# Patient Record
Sex: MALE | Employment: FULL TIME | URBAN - METROPOLITAN AREA
[De-identification: names, ages, dates, MRNs, and addresses within clinical notes are randomized per-mention and may not be internally consistent; named-entity substitution may affect disease eponyms.]

---

## 2019-01-17 ENCOUNTER — OFFICE VISIT - HEALTHEAST (OUTPATIENT)
Dept: FAMILY MEDICINE | Facility: CLINIC | Age: 44
End: 2019-01-17

## 2019-01-17 DIAGNOSIS — M54.50 LUMBAR BACK PAIN: ICD-10-CM

## 2019-01-17 ASSESSMENT — MIFFLIN-ST. JEOR: SCORE: 2131.85

## 2019-01-30 ENCOUNTER — OFFICE VISIT - HEALTHEAST (OUTPATIENT)
Dept: FAMILY MEDICINE | Facility: CLINIC | Age: 44
End: 2019-01-30

## 2019-01-30 DIAGNOSIS — Z76.89 ENCOUNTER TO ESTABLISH CARE: ICD-10-CM

## 2019-01-30 DIAGNOSIS — M54.50 CHRONIC BILATERAL LOW BACK PAIN WITHOUT SCIATICA: ICD-10-CM

## 2019-01-30 DIAGNOSIS — G89.29 CHRONIC BILATERAL LOW BACK PAIN WITHOUT SCIATICA: ICD-10-CM

## 2021-06-02 VITALS — BODY MASS INDEX: 34.71 KG/M2 | HEIGHT: 73 IN | WEIGHT: 261.9 LBS

## 2021-06-02 VITALS — WEIGHT: 263.7 LBS | BODY MASS INDEX: 34.79 KG/M2

## 2021-06-17 NOTE — PATIENT INSTRUCTIONS - HE
Patient Instructions by Baron Puckett PA-C at 1/17/2019  4:40 PM     Author: Baron Puckett PA-C Service: -- Author Type: Physician Assistant    Filed: 1/17/2019  5:39 PM Encounter Date: 1/17/2019 Status: Addendum    : Baron Puckett PA-C (Physician Assistant)    Related Notes: Original Note by Baron Puckett PA-C (Physician Assistant) filed at 1/17/2019  5:39 PM       Establish care with primary care provider before he leaves the office today.  Your primary care provider will manage her work restrictions and also any work comp related paperwork.  I suggest you avoid heavy lifting, bending and twisting in the short-term.  Use of topical ice and ibuprofen for analgesia and anti-inflammatory effect.  Follow-up with primary care provider as mentioned      Patient Education     Back Pain (Acute or Chronic)    Back pain is one of the most common problems. The good news is that most people feel better in 1 to 2 weeks, and most of the rest in 1 to 2 months. Most people can remain active.  People who have pain describe it differently--not everyone is the same.    The pain can be sharp, stabbing, shooting, aching, cramping or burning.    Movement, standing, bending, lifting, sitting, or walking may worsen pain.    It can be localized to one spot or area, or it can be more generalized.    It can spread or radiate upwards, to the front, or go down your arms or legs (sciatica).    It can cause muscle spasm.  Most of the time, mechanical problems with the muscles or spine cause the pain. Mechanical problems are usually caused by an injury to the muscles or ligaments. While illness can cause back pain, it is usually not caused by a serious illness. Mechanical problems include:     Physical activity such as sports, exercise, work, or normal activity    Overexertion, lifting, pushing, pulling incorrectly or too aggressively    Sudden twisting, bending, or stretching from an accident, or accidental movement    Poor  posture    Stretching or moving wrong, without noticing pain at the time    Poor coordination, lack of regular exercise (check with your doctor about this)    Spinal disc disease or arthritis    Stress  Pain can also be related to pregnancy, or illness like appendicitis, bladder or kidney infections, pelvic infections, and many other things.  Acute back pain usually gets better in 1 to 2 weeks. Back pain related to disk disease, arthritis in the spinal joints or spinal stenosis (narrowing of the spinal canal) can become chronic and last for months or years.  Unless you had a physical injury (for example, a car accident or fall) X-rays are usually not needed for the initial evaluation of back pain. If pain continues and does not respond to medical treatment, X-rays and other tests may be needed.  Home care  Try these home care recommendations:    When in bed, try to find a position of comfort. A firm mattress is best. Try lying flat on your back with pillows under your knees. You can also try lying on your side with your knees bent up towards your chest and a pillow between your knees.    At first, do not try to stretch out the sore spots. If there is a strain, it is not like the good soreness you get after exercising without an injury. In this case, stretching may make it worse.    Don't sit for long periods, as in a long car ride or during other travel. This puts more stress on the lower back than standing or walking.    During the first 24 to 72 hours after an acute injury or flare up of chronic back pain, apply an ice pack to the painful area for 20 minutes and then remove it for 20 minutes. Do this over a period of 60 to 90 minutes or several times a day. This will reduce swelling and pain. Wrap the ice pack in a thin towel or plastic to protect your skin.    You can start with ice, then switch to heat. Heat (hot shower, hot bath, or heating pad) reduces pain and works well for muscle spasms. Heat can be applied  to the painful area for 20 minutes then remove it for 20 minutes. Do this over a period of 60 to 90 minutes or several times a day. Do not sleep on a heating pad. It can lead to skin burns or tissue damage.    You can alternate ice and heat therapy. Talk with your doctor about the best treatment for your back pain.    Therapeutic massage can help relax the back muscles without stretching them.    Be aware of safe lifting methods and do not lift anything without stretching first.  Medicines  Talk to your doctor before using medicine, especially if you have other medical problems or are taking other medicines.    You may use over-the-counter medicine as directed on the bottle to control pain, unless another pain medicine was prescribed. If you have chronic conditions like diabetes, liver or kidney disease, stomach ulcers, or gastrointestinal bleeding, or are taking blood thinners, talk to your doctor before taking any medicine.    Be careful if you are given a prescription medicines, narcotics, or medicine for muscle spasms. They can cause drowsiness, affect your coordination, reflexes, and judgement. Do not drive or operate heavy machinery.  Follow-up care  Follow up with your healthcare provider, or as advised.   A radiologist will review any X-rays that were taken. Your provide will notify you of any new findings that may affect your care.  Call 911  Call 911 if any of the following occur:    Trouble breathing    Confusion    Very drowsy or trouble awakening    Fainting or loss of consciousness    Rapid or very slow heart rate    Loss of bowel or bladder control  When to seek medical advice  Call your healthcare provider right away if any of these occur:     Pain becomes worse or spreads to your legs    Weakness or numbness in one or both legs    Numbness in the groin or genital area  Date Last Reviewed: 7/1/2016 2000-2017 [employer]. [address] [place] [id number]. All rights reserved.  This information is not intended as a substitute for professional medical care. Always follow your healthcare professional's instructions.           Patient Education     Exercises to Strengthen Your Lower Back  Strong lower back and abdominal muscles work together to support your spine. The exercises below will help strengthen the lower back. It is important that you begin exercising slowly and increase levels gradually.  Always begin any exercise program with stretching. If you feel pain while doing any of these exercises, stop and talk to your doctor about a more specific exercise program that better suits your condition.   Low back stretch  The point of stretching is to make you more flexible and increase your range of motion. Stretch only as much as you are able. Stretch slowly. Do not push your stretch to the limit. If at any point you feel pain while stretching, this is your (temporary) limit.    Lie on your back with your knees bent and both feet on the ground.    Slowly raise your left knee to your chest as you flatten your lower back against the floor. Hold for 5 seconds.    Relax and repeat the exercise with your right knee.    Do 10 of these exercises for each leg.    Repeat hugging both knees to your chest at the same time.  Building lower back strength  Start your exercise routine with 10 to 30 minutes a day, 1 to 3 times a day.  Initial exercises  Lying on your back:  1. Ankle pumps: Move your foot up and down, towards your head, and then away. Repeat 10 times with each foot.  2. Heel slides: Slowly bend your knee, drawing the heel of your foot towards you. Then slide your heel/foot from you, straightening your knee. Do not lift your foot off the floor (this is not a leg lift).  3. Abdominal contraction: Bend your knees and put your hands on your stomach. Tighten your stomach muscles. Hold for 5 seconds, then relax. Repeat 10 times.  4. Straight leg raise: Bend one leg at the knee and keep the other  leg straight. Tighten your stomach muscles. Slowly lift your straight leg 6 to 12 inches off the floor and hold for up to 5 seconds. Repeat 10 times on each side.  Standin. Wall squats: Stand with your back against the wall. Move your feet about 12 inches away from the wall. Tighten your stomach muscles, and slowly bend your knees until they are at about a 45 degree angle. Do not go down too far. Hold about 5 seconds. Then slowly return to your starting position. Repeat 10 times.  2. Heel raises: Stand facing the wall. Slowly raise the heels of your feet up and down, while keeping your toes on the floor. If you have trouble balancing, you can touch the wall with your hands. Repeat 10 times.  More advanced exercises  When you feel comfortable enough, try these exercises.  1. Kneeling lumbar extension: Begin on your hands and knees. At the same time, raise and straighten your right arm and left leg until they are parallel to the ground. Hold for 2 seconds and come back slowly to a starting position. Repeat with left arm and right leg, alternating 10 times.  2. Prone lumbar extension: Lie face down, arms extended overhead, palms on the floor. At the same time, raise your right arm and left leg as high as comfortably possible. Hold for 10 seconds and slowly return to start. Repeat with left arm and right leg, alternating 10 times. Gradually build up to 20 times. (Advanced: Repeat this exercise raising both arms and both legs a few inches off the floor at the same time. Hold for 5 seconds and release.)  3. Pelvic tilt: Lie on the floor on your back with your knees bent at 90 degrees. Your feet should be flat on the floor. Inhale, exhale, then slowly contract your abdominal muscles bringing your navel toward your spine. Let your pelvis rock back until your lower back is flat on the floor. Hold for 10 seconds while breathing smoothly.  4. Abdominal crunch: Perform a pelvic tilt (above) flattening your lower back  against the floor. Holding the tension in your abdominal muscles, take another breath and raise your shoulder blades off the ground (this is not a full sit-up). Keep your head in line with your body (dont bend your neck forward). Hold for 2 seconds, then slowly lower.  Date Last Reviewed: 6/1/2016 2000-2017 [employer] [address] [place], [place] [id number]. All rights reserved. This information is not intended as a substitute for professional medical care. Always follow your healthcare professional's instructions.

## 2021-06-18 NOTE — LETTER
Letter by Francois Salguero MD at      Author: Francois Salguero MD Service: -- Author Type: --    Filed:  Encounter Date: 1/30/2019 Status: (Other)       January 30, 2019     Patient: Michael Ray   YOB: 1975   Date of Visit: 1/30/2019       To Whom It May Concern:    Michael Ray been seen and treated at this medical facility.    He is restricted to lift, or carry no more than 10 lbs from today's date and going forward.     If you have any questions or concerns, please don't hesitate to call.    Sincerely,        Electronically signed by Francois Salguero MD

## 2021-06-18 NOTE — LETTER
Letter by Baron Puckett PA-C at      Author: Baron Puckett PA-C Service: -- Author Type: --    Filed:  Encounter Date: 1/17/2019 Status: (Other)       January 17, 2019     Patient: Michael Ray   YOB: 1975   Date of Visit: 1/17/2019       To Whom It May Concern:    It is my medical opinion that Michael Ray may return to light duty immediately with the following restrictions: No bending twisting crawling climbing ladders working from waist to floor.  Lifting only 10 pounds.  Restriction will be in place for 7-10 days until he seen by his primary care provider.    If you have any questions or concerns, please don't hesitate to call.    Sincerely,        Electronically signed by Baron Pucktet PA-C

## 2021-06-23 NOTE — PROGRESS NOTES
Assessment/Plan:        1. Encounter to establish care    2. Chronic bilateral low back pain without sciatica  Acute on chronic low back pain due to heavy lifting.   Work comp.   See work note/ restrictions.     Follow up prn         Follow up for routine physicals.         Subjective:    Patient ID:   Michael Ray is a 43 y.o. male Michael Ray is a 43 y.o. male here to establish care and in follow up to his chronic low back pain.  He has a history of chronic low back pain with intermittent flare ups, but last aggravated 2 weeks ago by lifting heaving objects at work. He hurt his back years ago at work, and opted for non surgical management with back injections as opposed to surgery. He works for [employer], working as a  which does not involve lifting or bending. However, he occasionally gets asked to work at another department which involves heavy lifting of boxes.  He states that recently been working in this other department every day, adversely affecting his back.  He was seen in [facility] on 1/17 given restrictions which has been helping his back.      He would like to see restriction put in place to prevent him from working at this other department which involves heavy lifting.   He is otherwise fine with lifting and carrying up to 10 lbs of weight.       Review of Systems  General : negative  Ophthalmic : negative  ENT : negative  Respiratory : no cough, shortness of breath, or wheezing  Cardiovascular : no chest pain or dyspnea on exertion  Gastrointestinal : no abdominal pain, change in bowel habits, or black or bloody stools  Genito-Urinary :  no dysuria, trouble voiding, or hematuria  Musculoskeletal : See HPI   Neurological : negative  Dermatological : negative    Allergy: reviewed  Hematological and Lymphatic : negative  Endocrine : negative     The following patient's history were reviewed and updated as appropriate:   He  has a past medical history of Chronic back pain.  He  has a past surgical  history that includes No past surgeries.  His family history includes No Medical Problems in his brother, father, mother, and sister.  He  reports that  has never smoked. he has never used smokeless tobacco. He reports that he does not drink alcohol or use drugs..      No outpatient encounter medications on file as of 1/30/2019.     No facility-administered encounter medications on file as of 1/30/2019.          Objective:   /78 (Patient Site: Right Arm, Patient Position: Sitting, Cuff Size: Adult Large)   Pulse 75   Wt (!) 263 lb 11.2 oz (119.6 kg)   SpO2 95%   BMI 34.79 kg/m        Physical Exam  General Appearance:    Alert, well hydrated, no distress,    Eyes:    PERRL, conjunctiva/corneas clear,    Throat:   Lips, mucosa, and tongue normal; teeth and gums normal   Neck:   Supple, symmetrical, trachea midline, no adenopathy;        thyroid:  No enlargement/tenderness/nodules; no carotid    bruit or JVD   Lungs:     Clear to auscultation bilaterally, respirations unlabored   Heart:    Regular rate and rhythm, S1 and S2 normal, no murmur, rub   or gallop   Abdomen:     Soft, non-tender, normal bowel sounds, no rebound or guarding, no masses, no organomegaly   Back/ Extremities:   palpable tenderness to the lower back, Extremities normal, atraumatic, no cyanosis or edema   Skin:   Skin color, texture, turgor normal, no rashes or lesions

## 2021-06-23 NOTE — PATIENT INSTRUCTIONS - HE
1. Encounter to establish care    2. Chronic bilateral low back pain without sciatica    See work restriction.

## 2021-06-27 NOTE — PROGRESS NOTES
Progress Notes by Baron Puckett PA-C at 1/17/2019  4:40 PM     Author: Baron Puckett PA-C Service: -- Author Type: Physician Assistant    Filed: 1/17/2019  6:45 PM Encounter Date: 1/17/2019 Status: Signed    : Baron Puckett PA-C (Physician Assistant)       Subjective:      Patient ID: Michael Ray is a 43 y.o. male.    Chief Complaint:    HPI  Michael Ray is a 43 y.o. male who presents today complaining of concern for low back pain.  Patient has a past medical history for chronic and intermittent low back pain.  Patient has been seen at [facility] on 12/24/2013 for a lumbar MRI which showed moderate right central disc extrusion at L4-L5 and mild to moderate spinal canal stenosis and impingement on the right L5 nerve root.  Patient did have epidural steroid injections in the lumbar spine.  He has been managing this throughout the years.  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  Patient is ostensibly here today asking for a work restriction for his low back pain for his exacerbation of this pain currently.    He does not have any precipitating event or injury this is a chemo to have injury with a slow insidious onset.  He is describing low back pain in the right lower lumbar area.  He does not have any radicular symptoms at this time no fever no urinary fecal incontinence.  He has been using rest activity modification and over-the-counter anti-inflammatories with good relief currently.      No past medical history on file.    No past surgical history on file.    No family history on file.    Social History     Tobacco Use   ? Smoking status: Never Smoker   ? Smokeless tobacco: Never Used   Substance Use Topics   ? Alcohol use: Not on file   ? Drug use: Not on file       Review of Systems  As above in HPI, otherwise balance of Review of Systems are negative.    Objective:     BP  "118/76 (Patient Site: Right Arm, Patient Position: Sitting, Cuff Size: Adult Large)   Pulse 62   Temp 99  F (37.2  C) (Oral)   Resp 20   Ht 6' 1\" (1.854 m)   Wt (!) 261 lb 14.4 oz (118.8 kg)   SpO2 96%   BMI 34.55 kg/m      Physical Exam  General: Patient is resting comfortably no acute distress is afebrile  HEENT: Head is normocephalic atraumatic   Skin: Without rash non-diaphoretic  Musculoskeletal: With patient standing he has no pain over the cervical thoracic lumbar sacral spinous processes.  He has some paraspinal muscle tenderness in the right lower lumbar area.  He has minimal pain at the right SI joint.  With flexion to 90 degrees he does have reproduction of pain at the right lower lumbar.  Heel and toe walking does produce pain into the right lower lumbar area.  Patient seated he has a negative flip sign.  DTRs are 2 out of 4 on the bilateral lower extremities.  Strength is 5 out of 5 in the lateral lower extremities.    Assessment:     Procedures    1. Lumbar back pain         Plan:     1. Lumbar back pain         She has chronic intermittent recurrent low back pain.  Advised him to try to manage this with work restrictions so that he can continue to work however we will have him establish with primary care in the [facility] system.  They can have close follow-up for imaging, treatment and management of work restrictions as necessary.  Patient was given a 10-day short work restriction to help him rest until he is establishing care with his primary care provider and further workup for his chronic symptoms can be addressed.    Patient Instructions   Establish care with primary care provider before he leaves the office today.  Your primary care provider will manage her work restrictions and also any work comp related paperwork.  I suggest you avoid heavy lifting, bending and twisting in the short-term.  Use of topical ice and ibuprofen for analgesia and anti-inflammatory effect.  Follow-up with " primary care provider as mentioned      Patient Education     Back Pain (Acute or Chronic)    Back pain is one of the most common problems. The good news is that most people feel better in 1 to 2 weeks, and most of the rest in 1 to 2 months. Most people can remain active.  People who have pain describe it differently--not everyone is the same.    The pain can be sharp, stabbing, shooting, aching, cramping or burning.    Movement, standing, bending, lifting, sitting, or walking may worsen pain.    It can be localized to one spot or area, or it can be more generalized.    It can spread or radiate upwards, to the front, or go down your arms or legs (sciatica).    It can cause muscle spasm.  Most of the time, mechanical problems with the muscles or spine cause the pain. Mechanical problems are usually caused by an injury to the muscles or ligaments. While illness can cause back pain, it is usually not caused by a serious illness. Mechanical problems include:     Physical activity such as sports, exercise, work, or normal activity    Overexertion, lifting, pushing, pulling incorrectly or too aggressively    Sudden twisting, bending, or stretching from an accident, or accidental movement    Poor posture    Stretching or moving wrong, without noticing pain at the time    Poor coordination, lack of regular exercise (check with your doctor about this)    Spinal disc disease or arthritis    Stress  Pain can also be related to pregnancy, or illness like appendicitis, bladder or kidney infections, pelvic infections, and many other things.  Acute back pain usually gets better in 1 to 2 weeks. Back pain related to disk disease, arthritis in the spinal joints or spinal stenosis (narrowing of the spinal canal) can become chronic and last for months or years.  Unless you had a physical injury (for example, a car accident or fall) X-rays are usually not needed for the initial evaluation of back pain. If pain continues and does not  respond to medical treatment, X-rays and other tests may be needed.  Home care  Try these home care recommendations:    When in bed, try to find a position of comfort. A firm mattress is best. Try lying flat on your back with pillows under your knees. You can also try lying on your side with your knees bent up towards your chest and a pillow between your knees.    At first, do not try to stretch out the sore spots. If there is a strain, it is not like the good soreness you get after exercising without an injury. In this case, stretching may make it worse.    Don't sit for long periods, as in a long car ride or during other travel. This puts more stress on the lower back than standing or walking.    During the first 24 to 72 hours after an acute injury or flare up of chronic back pain, apply an ice pack to the painful area for 20 minutes and then remove it for 20 minutes. Do this over a period of 60 to 90 minutes or several times a day. This will reduce swelling and pain. Wrap the ice pack in a thin towel or plastic to protect your skin.    You can start with ice, then switch to heat. Heat (hot shower, hot bath, or heating pad) reduces pain and works well for muscle spasms. Heat can be applied to the painful area for 20 minutes then remove it for 20 minutes. Do this over a period of 60 to 90 minutes or several times a day. Do not sleep on a heating pad. It can lead to skin burns or tissue damage.    You can alternate ice and heat therapy. Talk with your doctor about the best treatment for your back pain.    Therapeutic massage can help relax the back muscles without stretching them.    Be aware of safe lifting methods and do not lift anything without stretching first.  Medicines  Talk to your doctor before using medicine, especially if you have other medical problems or are taking other medicines.    You may use over-the-counter medicine as directed on the bottle to control pain, unless another pain medicine was  prescribed. If you have chronic conditions like diabetes, liver or kidney disease, stomach ulcers, or gastrointestinal bleeding, or are taking blood thinners, talk to your doctor before taking any medicine.    Be careful if you are given a prescription medicines, narcotics, or medicine for muscle spasms. They can cause drowsiness, affect your coordination, reflexes, and judgement. Do not drive or operate heavy machinery.  Follow-up care  Follow up with your healthcare provider, or as advised.   A radiologist will review any X-rays that were taken. Your provide will notify you of any new findings that may affect your care.  Call 911  Call 911 if any of the following occur:    Trouble breathing    Confusion    Very drowsy or trouble awakening    Fainting or loss of consciousness    Rapid or very slow heart rate    Loss of bowel or bladder control  When to seek medical advice  Call your healthcare provider right away if any of these occur:     Pain becomes worse or spreads to your legs    Weakness or numbness in one or both legs    Numbness in the groin or genital area  Date Last Reviewed: 7/1/2016 2000-2017 [employer]. [address] [place] [address]. All rights reserved. This information is not intended as a substitute for professional medical care. Always follow your healthcare professional's instructions.           Patient Education     Exercises to Strengthen Your Lower Back  Strong lower back and abdominal muscles work together to support your spine. The exercises below will help strengthen the lower back. It is important that you begin exercising slowly and increase levels gradually.  Always begin any exercise program with stretching. If you feel pain while doing any of these exercises, stop and talk to your doctor about a more specific exercise program that better suits your condition.   Low back stretch  The point of stretching is to make you more flexible and increase your range of  motion. Stretch only as much as you are able. Stretch slowly. Do not push your stretch to the limit. If at any point you feel pain while stretching, this is your (temporary) limit.    Lie on your back with your knees bent and both feet on the ground.    Slowly raise your left knee to your chest as you flatten your lower back against the floor. Hold for 5 seconds.    Relax and repeat the exercise with your right knee.    Do 10 of these exercises for each leg.    Repeat hugging both knees to your chest at the same time.  Building lower back strength  Start your exercise routine with 10 to 30 minutes a day, 1 to 3 times a day.  Initial exercises  Lying on your back:  1. Ankle pumps: Move your foot up and down, towards your head, and then away. Repeat 10 times with each foot.  2. Heel slides: Slowly bend your knee, drawing the heel of your foot towards you. Then slide your heel/foot from you, straightening your knee. Do not lift your foot off the floor (this is not a leg lift).  3. Abdominal contraction: Bend your knees and put your hands on your stomach. Tighten your stomach muscles. Hold for 5 seconds, then relax. Repeat 10 times.  4. Straight leg raise: Bend one leg at the knee and keep the other leg straight. Tighten your stomach muscles. Slowly lift your straight leg 6 to 12 inches off the floor and hold for up to 5 seconds. Repeat 10 times on each side.  Standin. Wall squats: Stand with your back against the wall. Move your feet about 12 inches away from the wall. Tighten your stomach muscles, and slowly bend your knees until they are at about a 45 degree angle. Do not go down too far. Hold about 5 seconds. Then slowly return to your starting position. Repeat 10 times.  2. Heel raises: Stand facing the wall. Slowly raise the heels of your feet up and down, while keeping your toes on the floor. If you have trouble balancing, you can touch the wall with your hands. Repeat 10 times.  More advanced  exercises  When you feel comfortable enough, try these exercises.  1. Kneeling lumbar extension: Begin on your hands and knees. At the same time, raise and straighten your right arm and left leg until they are parallel to the ground. Hold for 2 seconds and come back slowly to a starting position. Repeat with left arm and right leg, alternating 10 times.  2. Prone lumbar extension: Lie face down, arms extended overhead, palms on the floor. At the same time, raise your right arm and left leg as high as comfortably possible. Hold for 10 seconds and slowly return to start. Repeat with left arm and right leg, alternating 10 times. Gradually build up to 20 times. (Advanced: Repeat this exercise raising both arms and both legs a few inches off the floor at the same time. Hold for 5 seconds and release.)  3. Pelvic tilt: Lie on the floor on your back with your knees bent at 90 degrees. Your feet should be flat on the floor. Inhale, exhale, then slowly contract your abdominal muscles bringing your navel toward your spine. Let your pelvis rock back until your lower back is flat on the floor. Hold for 10 seconds while breathing smoothly.  4. Abdominal crunch: Perform a pelvic tilt (above) flattening your lower back against the floor. Holding the tension in your abdominal muscles, take another breath and raise your shoulder blades off the ground (this is not a full sit-up). Keep your head in line with your body (dont bend your neck forward). Hold for 2 seconds, then slowly lower.  Date Last Reviewed: 6/1/2016 2000-2017 [employer]. [address] [place] [address], [place] [address]. All rights reserved. This information is not intended as a substitute for professional medical care. Always follow your healthcare professional's instructions.